# Patient Record
Sex: MALE | Race: WHITE | NOT HISPANIC OR LATINO | Employment: FULL TIME | URBAN - METROPOLITAN AREA
[De-identification: names, ages, dates, MRNs, and addresses within clinical notes are randomized per-mention and may not be internally consistent; named-entity substitution may affect disease eponyms.]

---

## 2018-07-20 ENCOUNTER — HISTORICAL (OUTPATIENT)
Dept: RADIOLOGY | Facility: HOSPITAL | Age: 48
End: 2018-07-20

## 2019-06-10 LAB
ABS NEUT (OLG): 3.8 X10(3)/MCL (ref 1.5–6.9)
ALBUMIN SERPL-MCNC: 4 GM/DL (ref 3.4–5)
ALBUMIN/GLOB SERPL: 1.1 RATIO
ALP SERPL-CCNC: 61 UNIT/L (ref 30–113)
ALT SERPL-CCNC: 39 UNIT/L (ref 10–45)
APTT PPP: 27.2 SECOND(S) (ref 25–35)
AST SERPL-CCNC: 22 UNIT/L (ref 15–37)
BILIRUB SERPL-MCNC: 0.5 MG/DL (ref 0.1–0.9)
BILIRUBIN DIRECT+TOT PNL SERPL-MCNC: 0.1 MG/DL (ref 0–0.3)
BILIRUBIN DIRECT+TOT PNL SERPL-MCNC: 0.4 MG/DL
BUN SERPL-MCNC: 15 MG/DL (ref 10–20)
CALCIUM SERPL-MCNC: 9.5 MG/DL (ref 8–10.5)
CHLORIDE SERPL-SCNC: 100 MMOL/L (ref 100–108)
CO2 SERPL-SCNC: 33 MMOL/L (ref 21–35)
CREAT SERPL-MCNC: 1.02 MG/DL (ref 0.7–1.3)
ERYTHROCYTE [DISTWIDTH] IN BLOOD BY AUTOMATED COUNT: 12.7 % (ref 11.5–17)
GLOBULIN SER-MCNC: 3.7 GM/DL
GLUCOSE SERPL-MCNC: 152 MG/DL (ref 75–116)
HCT VFR BLD AUTO: 46.3 % (ref 42–52)
HGB BLD-MCNC: 15.4 GM/DL (ref 14–18)
INR PPP: 1 (ref 0–1.2)
MCH RBC QN AUTO: 31 PG (ref 27–34)
MCHC RBC AUTO-ENTMCNC: 33 GM/DL (ref 31–36)
MCV RBC AUTO: 93 FL (ref 80–99)
PLATELET # BLD AUTO: 243 X10(3)/MCL (ref 140–400)
PMV BLD AUTO: 8.9 FL (ref 6.8–10)
POTASSIUM SERPL-SCNC: 4 MMOL/L (ref 3.6–5.2)
PROT SERPL-MCNC: 7.7 GM/DL (ref 6.4–8.2)
PROTHROMBIN TIME: 10.2 SECOND(S) (ref 9–12)
RBC # BLD AUTO: 4.98 X10(6)/MCL (ref 4.7–6.1)
SODIUM SERPL-SCNC: 138 MMOL/L (ref 135–145)
WBC # SPEC AUTO: 5.9 X10(3)/MCL (ref 4.5–11.5)

## 2019-06-13 ENCOUNTER — HISTORICAL (OUTPATIENT)
Dept: ANESTHESIOLOGY | Facility: HOSPITAL | Age: 49
End: 2019-06-13

## 2022-04-11 ENCOUNTER — HISTORICAL (OUTPATIENT)
Dept: ADMINISTRATIVE | Facility: HOSPITAL | Age: 52
End: 2022-04-11

## 2022-04-26 VITALS
SYSTOLIC BLOOD PRESSURE: 137 MMHG | OXYGEN SATURATION: 97 % | HEIGHT: 67 IN | BODY MASS INDEX: 26.84 KG/M2 | DIASTOLIC BLOOD PRESSURE: 84 MMHG | WEIGHT: 171 LBS

## 2022-04-30 NOTE — OP NOTE
PROCEDURE:  Colonoscopy.    PREOPERATIVE DIAGNOSIS:  Screening colonoscopy.    POSTOPERATIVE DIAGNOSIS:  Normal colonoscopy.    HISTORY:  A 49-year-old white male without any alarm symptoms, here for screening colonoscopy.     He was consented for the procedure prior to at my office.  Risks and benefits of procedure were explained to him in detail.  He is willing to undergo those risks.    PROCEDURE IN DETAIL:  He has brought down to the endoscopy suite, laid in the left lateral decubitus position.  Rectal exam was performed and it was good.  No internal and no external abnormalities.  Good rectal tone.  Prostate was normal.  Trilobar without any enlargement or nodularity.     The Olympus colonoscope was then lubricated and advanced with some moderate difficulty at the hepatic flexure due to some colonic laxity, but was able to easily get to cecum after some positional changes.       I was able to intubate the terminal ileum up to about 10 cm.  There were no masses, polyps, or mucosal changes to the cecum.     Three-hundred-sixty-degree circumferential views were then taken of the ascending colon and the entirety of the colon.  There were no abnormalities noted to the ascending colon and hepatic flexure.  There was an area initially thought to be a polyp in the transverse colon, but I took about 4 to 5 longitudinal looks back and forth and this initial mucosal abnormality that was thought to be a polyp essentially I believe was a fold and just disappeared into the mucosa with insufflation.  No polyp was harvested.  The rest of the splenic flexure, descending colon and sigmoid were all within normal limits.  He had no diverticular disease that was discernible.  The rectum on retroflexion showed no internal abnormalities.  The scope was withdrawn.  Patient tolerated the procedure well.  No complications.     In summary, a 49-year-old white male with a normal screening colonoscopy.  Recommend repeat  colonoscopy in 10 years unless clinically indicated sooner.     Thank you Dr. Raine Bearden for you allowing me to participate in the care of her patients.        SOPHIA/LISS   DD: 06/13/2019 0737   DT: 06/13/2019 0753  Job # [id number]    cc: Raine Bearden M.D.

## 2022-05-25 ENCOUNTER — HOSPITAL ENCOUNTER (OUTPATIENT)
Dept: RADIOLOGY | Facility: CLINIC | Age: 52
Discharge: HOME | End: 2022-05-25
Attending: ORTHOPAEDIC SURGERY
Payer: COMMERCIAL

## 2022-05-25 ENCOUNTER — HOSPITAL ENCOUNTER (OUTPATIENT)
Dept: RADIOLOGY | Facility: CLINIC | Age: 52
Discharge: HOME | End: 2022-05-25
Attending: NURSE PRACTITIONER

## 2022-05-25 ENCOUNTER — OFFICE VISIT (OUTPATIENT)
Dept: ORTHOPEDICS | Facility: CLINIC | Age: 52
End: 2022-05-25
Payer: COMMERCIAL

## 2022-05-25 DIAGNOSIS — M54.12 CERVICAL RADICULOPATHY: ICD-10-CM

## 2022-05-25 DIAGNOSIS — G89.29 CHRONIC RIGHT SHOULDER PAIN: ICD-10-CM

## 2022-05-25 DIAGNOSIS — S46.011D TRAUMATIC TEAR OF RIGHT ROTATOR CUFF, UNSPECIFIED TEAR EXTENT, SUBSEQUENT ENCOUNTER: ICD-10-CM

## 2022-05-25 DIAGNOSIS — M25.511 ACUTE PAIN OF RIGHT SHOULDER: ICD-10-CM

## 2022-05-25 DIAGNOSIS — M25.511 CHRONIC RIGHT SHOULDER PAIN: ICD-10-CM

## 2022-05-25 DIAGNOSIS — M54.12 CERVICAL RADICULOPATHY: Primary | ICD-10-CM

## 2022-05-25 PROCEDURE — 72050 XR CERVICAL SPINE COMPLETE 5 VIEW: ICD-10-PCS | Mod: ,,, | Performed by: NURSE PRACTITIONER

## 2022-05-25 PROCEDURE — 1159F MED LIST DOCD IN RCRD: CPT | Mod: CPTII,,, | Performed by: NURSE PRACTITIONER

## 2022-05-25 PROCEDURE — 73030 X-RAY EXAM OF SHOULDER: CPT | Mod: RT,,, | Performed by: NURSE PRACTITIONER

## 2022-05-25 PROCEDURE — 72050 X-RAY EXAM NECK SPINE 4/5VWS: CPT | Mod: ,,, | Performed by: NURSE PRACTITIONER

## 2022-05-25 PROCEDURE — 72040: ICD-10-PCS | Mod: ,,, | Performed by: NURSE PRACTITIONER

## 2022-05-25 PROCEDURE — 99203 PR OFFICE/OUTPT VISIT, NEW, LEVL III, 30-44 MIN: ICD-10-PCS | Mod: ,,, | Performed by: NURSE PRACTITIONER

## 2022-05-25 PROCEDURE — 1159F PR MEDICATION LIST DOCUMENTED IN MEDICAL RECORD: ICD-10-PCS | Mod: CPTII,,, | Performed by: NURSE PRACTITIONER

## 2022-05-25 PROCEDURE — 72040 X-RAY EXAM NECK SPINE 2-3 VW: CPT | Mod: ,,, | Performed by: NURSE PRACTITIONER

## 2022-05-25 PROCEDURE — 73030 PR  X-RAY SHOULDER 2+ VW: ICD-10-PCS | Mod: RT,,, | Performed by: NURSE PRACTITIONER

## 2022-05-25 PROCEDURE — 4010F ACE/ARB THERAPY RXD/TAKEN: CPT | Mod: CPTII,,, | Performed by: NURSE PRACTITIONER

## 2022-05-25 PROCEDURE — 99203 OFFICE O/P NEW LOW 30 MIN: CPT | Mod: ,,, | Performed by: NURSE PRACTITIONER

## 2022-05-25 PROCEDURE — 4010F PR ACE/ARB THEARPY RXD/TAKEN: ICD-10-PCS | Mod: CPTII,,, | Performed by: NURSE PRACTITIONER

## 2022-05-25 RX ORDER — VERAPAMIL HYDROCHLORIDE 240 MG/1
240 CAPSULE, EXTENDED RELEASE ORAL DAILY
COMMUNITY
Start: 2022-04-25

## 2022-05-25 RX ORDER — TESTOSTERONE CYPIONATE 200 MG/ML
INJECTION, SOLUTION INTRAMUSCULAR
COMMUNITY
Start: 2022-03-09

## 2022-05-25 RX ORDER — TRANDOLAPRIL 4 MG/1
4 TABLET ORAL DAILY
COMMUNITY
Start: 2022-04-25

## 2022-05-25 RX ORDER — FLUTICASONE PROPIONATE 50 MCG
SPRAY, SUSPENSION (ML) NASAL
COMMUNITY
Start: 2022-05-05

## 2022-05-25 RX ORDER — CELECOXIB 200 MG/1
200 CAPSULE ORAL DAILY
COMMUNITY
Start: 2022-05-05

## 2022-05-25 NOTE — PROGRESS NOTES
Chief Complaint:   Chief Complaint   Patient presents with    Right Shoulder - Pain    Pain     Right shoulder pain, patient had sx on this shoulder 2x before and the pain is back       History of present illness:  He is a pleasant 52-year-old who presents with right shoulder pain.  He has a history of rotator cuff repairs around 2005 and again in 2019. He reports an incident where he fell in the shower in recheck for his right rotator cuff.  He tried formal therapy which did give him some relief for a while.  He also notes numbness and tingling posteriorly in his shoulder that runs down his arm.  He denies previous cervical spine issues.  Of note he is also status post left sided rotator cuff repair. Here for second opinion      Past Medical History:   Diagnosis Date    Carpal tunnel syndrome     Hypertension        Past Surgical History:   Procedure Laterality Date    CARPAL TUNNEL RELEASE      HERNIA REPAIR      KNEE ARTHROSCOPY      KNEE SURGERY      SHOULDER ARTHROSCOPY      SHOULDER SURGERY         Current Outpatient Medications   Medication Sig    celecoxib (CELEBREX) 200 MG capsule Take 200 mg by mouth once daily.    fluticasone propionate (FLONASE) 50 mcg/actuation nasal spray SMARTSI-2 Spray(s) Both Nares Daily PRN    testosterone cypionate (DEPOTESTOTERONE CYPIONATE) 200 mg/mL injection SMARTSI.5 Milliliter(s) IM Once a Week    trandolapriL (MAVIK) 4 MG Tab Take 4 mg by mouth once daily.    verapamiL (VERELAN) 240 MG C24P Take 240 mg by mouth once daily.     No current facility-administered medications for this visit.       Review of patient's allergies indicates:  No Known Allergies    History reviewed. No pertinent family history.    Social History     Socioeconomic History    Marital status: Unknown   Tobacco Use    Smoking status: Never Smoker    Smokeless tobacco: Never Used       Review of Systems:    Constitution:   Denies chills, fever, and sweats.  HENT:   Denies headaches  or blurry vision.  Cardiovascular:  Denies chest pain or irregular heart beat.  Respiratory:   Denies cough or shortness of breath.  Gastrointestinal:  Denies abdominal pain, nausea, or vomiting.  Musculoskeletal:   Denies muscle cramps.  Neurological:   Denies dizziness or focal weakness.  Psychiatric/Behavior: Normal mental status.  Hematology/Lymph:  Denies bleeding problem or easy bruising/bleeding.  Skin:    Denies rash or suspicious lesions.    Examination:    Vital Signs:    Vitals:    05/25/22 1512   PainSc:   6       There is no height or weight on file to calculate BMI.    Constitution:   Well-developed, well nourished patient in no acute distress.  Neurological:   Alert and oriented x 3 and cooperative to examination.     Psychiatric/Behavior: Normal mental status.  Respiratory:   No shortness of breath.  Eyes:    Extraoccular muscles intact  Skin:    No scars, rash or suspicious lesions.    MSK:     Shoulder Exam:                   Right        Left  Skin:                                   Normal     Normal  AC joint tenderness:           None         None  Forward Flexion:                180            180  Abduction:                          180           180  External Rotation:               80              80  Internal Rotation:                80             80  Supraspinatus stress test: Neg           Neg  Hawkin's Impingement:     Neg           Neg  Neer Impingement:            Neg           Neg  Apprehension:                   Neg           Neg  [place]'s:                           Neg           Neg  Speed's test:                     Neg            Neg  Strength:  External Rotation:           5/5                5/5  Lift Off/belly press:          5/5                5/5    N-V status:                   Intact             Intact    C-spine: Normal ROM, pain with extension      Imaging: X-rays ordered and images interpreted today personally by me of his right shoulder show normal bony alignment.  Cervical spine xrays show degenerative changes at C5/6, C6/7.        Assessment: Cervical radiculopathy  -     X-ray Shoulder 2 or More Views Right; Future; Expected date: 05/25/2022  -     X-Ray Cervical Spine Complete 5 view; Future; Expected date: 05/25/2022    Traumatic tear of right rotator cuff, unspecified tear extent, subsequent encounter        Plan:  We will get an MRI of his right shoulder to evaluate his rotator cuff tear and see him back after results.

## 2022-06-06 ENCOUNTER — OFFICE VISIT (OUTPATIENT)
Dept: ORTHOPEDICS | Facility: CLINIC | Age: 52
End: 2022-06-06
Payer: COMMERCIAL

## 2022-06-06 VITALS
BODY MASS INDEX: 27.32 KG/M2 | SYSTOLIC BLOOD PRESSURE: 137 MMHG | HEIGHT: 66 IN | WEIGHT: 170 LBS | DIASTOLIC BLOOD PRESSURE: 84 MMHG

## 2022-06-06 DIAGNOSIS — M75.121 NONTRAUMATIC COMPLETE TEAR OF RIGHT ROTATOR CUFF: Primary | ICD-10-CM

## 2022-06-06 LAB
ANION GAP SERPL CALC-SCNC: 11 MEQ/L
BUN SERPL-MCNC: 18.8 MG/DL (ref 8.4–25.7)
CALCIUM SERPL-MCNC: 9.7 MG/DL (ref 8.4–10.2)
CHLORIDE SERPL-SCNC: 102 MMOL/L (ref 98–107)
CO2 SERPL-SCNC: 26 MMOL/L (ref 22–29)
CREAT SERPL-MCNC: 0.88 MG/DL (ref 0.73–1.18)
CREAT/UREA NIT SERPL: 21
GLUCOSE SERPL-MCNC: 81 MG/DL (ref 74–100)
POTASSIUM SERPL-SCNC: 4.1 MMOL/L (ref 3.5–5.1)
SODIUM SERPL-SCNC: 139 MMOL/L (ref 136–145)

## 2022-06-06 PROCEDURE — 1159F PR MEDICATION LIST DOCUMENTED IN MEDICAL RECORD: ICD-10-PCS | Mod: CPTII,,, | Performed by: ORTHOPAEDIC SURGERY

## 2022-06-06 PROCEDURE — 4010F ACE/ARB THERAPY RXD/TAKEN: CPT | Mod: CPTII,,, | Performed by: ORTHOPAEDIC SURGERY

## 2022-06-06 PROCEDURE — 99213 OFFICE O/P EST LOW 20 MIN: CPT | Mod: ,,, | Performed by: ORTHOPAEDIC SURGERY

## 2022-06-06 PROCEDURE — 99213 PR OFFICE/OUTPT VISIT, EST, LEVL III, 20-29 MIN: ICD-10-PCS | Mod: ,,, | Performed by: ORTHOPAEDIC SURGERY

## 2022-06-06 PROCEDURE — 36415 COLL VENOUS BLD VENIPUNCTURE: CPT | Performed by: ORTHOPAEDIC SURGERY

## 2022-06-06 PROCEDURE — 3079F PR MOST RECENT DIASTOLIC BLOOD PRESSURE 80-89 MM HG: ICD-10-PCS | Mod: CPTII,,, | Performed by: ORTHOPAEDIC SURGERY

## 2022-06-06 PROCEDURE — 93010 ELECTROCARDIOGRAM REPORT: CPT | Mod: ICN,,, | Performed by: INTERNAL MEDICINE

## 2022-06-06 PROCEDURE — 3075F PR MOST RECENT SYSTOLIC BLOOD PRESS GE 130-139MM HG: ICD-10-PCS | Mod: CPTII,,, | Performed by: ORTHOPAEDIC SURGERY

## 2022-06-06 PROCEDURE — 80048 BASIC METABOLIC PNL TOTAL CA: CPT | Performed by: ORTHOPAEDIC SURGERY

## 2022-06-06 PROCEDURE — 4010F PR ACE/ARB THEARPY RXD/TAKEN: ICD-10-PCS | Mod: CPTII,,, | Performed by: ORTHOPAEDIC SURGERY

## 2022-06-06 PROCEDURE — 3079F DIAST BP 80-89 MM HG: CPT | Mod: CPTII,,, | Performed by: ORTHOPAEDIC SURGERY

## 2022-06-06 PROCEDURE — 3008F BODY MASS INDEX DOCD: CPT | Mod: CPTII,,, | Performed by: ORTHOPAEDIC SURGERY

## 2022-06-06 PROCEDURE — 3008F PR BODY MASS INDEX (BMI) DOCUMENTED: ICD-10-PCS | Mod: CPTII,,, | Performed by: ORTHOPAEDIC SURGERY

## 2022-06-06 PROCEDURE — 3075F SYST BP GE 130 - 139MM HG: CPT | Mod: CPTII,,, | Performed by: ORTHOPAEDIC SURGERY

## 2022-06-06 PROCEDURE — 1159F MED LIST DOCD IN RCRD: CPT | Mod: CPTII,,, | Performed by: ORTHOPAEDIC SURGERY

## 2022-06-06 PROCEDURE — 93005 ELECTROCARDIOGRAM TRACING: CPT

## 2022-06-06 PROCEDURE — 93010 EKG 12-LEAD: ICD-10-PCS | Mod: ICN,,, | Performed by: INTERNAL MEDICINE

## 2022-06-06 RX ORDER — SODIUM CHLORIDE 9 MG/ML
INJECTION, SOLUTION INTRAVENOUS CONTINUOUS
Status: CANCELLED | OUTPATIENT
Start: 2022-06-23

## 2022-06-06 RX ORDER — CEFAZOLIN SODIUM 2 G/50ML
2 SOLUTION INTRAVENOUS
Status: CANCELLED | OUTPATIENT
Start: 2022-07-14

## 2022-06-06 RX ORDER — MUPIROCIN 20 MG/G
OINTMENT TOPICAL
Status: CANCELLED | OUTPATIENT
Start: 2022-07-14

## 2022-06-06 NOTE — PROGRESS NOTES
Chief Complaint:   Chief Complaint   Patient presents with    Right Shoulder - Pain    Results     MRI results Right shoulder       Consulting Physician: No ref. provider found    History of present illness:  He is a pleasant 52-year-old who presents with right shoulder pain.  He has a history of rotator cuff repairs around 2005 and again in 2019. He reports an incident where he fell in the shower in recheck for his right rotator cuff.  He tried formal therapy which did give him some relief for a while.  He also notes numbness and tingling posteriorly in his shoulder that runs down his arm.  He denies previous cervical spine issues.  Of note he is also status post left sided rotator cuff repair. Here for second opinion      He returns status post MRI.    Past Medical History:   Diagnosis Date    Carpal tunnel syndrome     Hypertension        Past Surgical History:   Procedure Laterality Date    CARPAL TUNNEL RELEASE      HERNIA REPAIR      KNEE ARTHROSCOPY      KNEE SURGERY      SHOULDER ARTHROSCOPY      SHOULDER SURGERY         Current Outpatient Medications   Medication Sig    celecoxib (CELEBREX) 200 MG capsule Take 200 mg by mouth once daily.    fluticasone propionate (FLONASE) 50 mcg/actuation nasal spray SMARTSI-2 Spray(s) Both Nares Daily PRN    testosterone cypionate (DEPOTESTOTERONE CYPIONATE) 200 mg/mL injection SMARTSI.5 Milliliter(s) IM Once a Week    trandolapriL (MAVIK) 4 MG Tab Take 4 mg by mouth once daily.    verapamiL (VERELAN) 240 MG C24P Take 240 mg by mouth once daily.     No current facility-administered medications for this visit.       Review of patient's allergies indicates:  No Known Allergies    History reviewed. No pertinent family history.    Social History     Socioeconomic History    Marital status: Unknown   Tobacco Use    Smoking status: Never Smoker    Smokeless tobacco: Never Used       Review of Systems:    Constitution:   Denies chills, fever, and  "sweats.  HENT:   Denies headaches or blurry vision.  Cardiovascular:  Denies chest pain or irregular heart beat.  Respiratory:   Denies cough or shortness of breath.  Gastrointestinal:  Denies abdominal pain, nausea, or vomiting.  Musculoskeletal:   Denies muscle cramps.  Neurological:   Denies dizziness or focal weakness.  Psychiatric/Behavior: Normal mental status.  Hematology/Lymph:  Denies bleeding problem or easy bruising/bleeding.  Skin:    Denies rash or suspicious lesions.    Examination:    Vital Signs:    Vitals:    06/06/22 1320   BP: 137/84   Weight: 77.1 kg (170 lb)   Height: 5' 6" (1.676 m)       Body mass index is 27.44 kg/m².    Constitution:   Well-developed, well nourished patient in no acute distress.  Neurological:   Alert and oriented x 3 and cooperative to examination.     Psychiatric/Behavior: Normal mental status.  Respiratory:   No shortness of breath.  Eyes:    Extraoccular muscles intact  Skin:    No scars, rash or suspicious lesions.    MSK:   Shoulder Exam:                   Right        Left  Skin:                                   Normal     Normal  AC joint tenderness:           None         None  Forward Flexion:                170            180  Abduction:                          100           180  External Rotation:               80              80  Internal Rotation:                80             80  Supraspinatus stress test: +           Neg  Hawkin's Impingement:     +           Neg  Neer Impingement:            +          Neg  Apprehension:                   Neg           Neg  [place]'s:                           Neg           Neg  Speed's test:                     Neg            Neg  Strength:  External Rotation:           5/5                5/5  Lift Off/belly press:          5/5                5/5    N-V status:                   Intact             Intact    C-spine: Normal ROM, pain with extension    Imaging:  MRI shoulder was reviewed which shows advanced tendinosis of " the supraspinatus with high-grade partial thickness tearing.       Assessment: Nontraumatic complete tear of right rotator cuff        Plan:  I recommended surgical intervention:  Right shoulder arthroscopic rotator cuff repair with allograft augmentation.  We discussed the details of the procedure and expected postoperative course.  We discussed the benefits of surgery which we did decrease his pain and increased function.  We discussed the risks of surgery which is small but could be significant is continued pain, stiffness, or infection.  After discussion about proceed.  Plans for surgery June 23

## 2022-07-11 NOTE — H&P
Chief Complaint:   MRI results Right shoulder    Consulting Physician: Self, Aaareferral    History of present illness:  He is a pleasant 52-year-old who presents with right shoulder pain.  He has a history of rotator cuff repairs around 2005 and again in 2019. He reports an incident where he fell in the shower in recheck for his right rotator cuff.  He tried formal therapy which did give him some relief for a while.  He also notes numbness and tingling posteriorly in his shoulder that runs down his arm.  He denies previous cervical spine issues.  Of note he is also status post left sided rotator cuff repair. Here for second opinion      He returns status post MRI.    Past Medical History:   Diagnosis Date    Carpal tunnel syndrome     Hypertension     Sleep apnea     cpap       Past Surgical History:   Procedure Laterality Date    CARPAL TUNNEL RELEASE      HERNIA REPAIR      KNEE ARTHROSCOPY      KNEE SURGERY      SHOULDER ARTHROSCOPY      SHOULDER SURGERY         No current facility-administered medications for this encounter.     Current Outpatient Medications   Medication Sig    celecoxib (CELEBREX) 200 MG capsule Take 200 mg by mouth once daily.    fluticasone propionate (FLONASE) 50 mcg/actuation nasal spray SMARTSI-2 Spray(s) Both Nares Daily PRN    testosterone cypionate (DEPOTESTOTERONE CYPIONATE) 200 mg/mL injection SMARTSI.5 Milliliter(s) IM Once a Week    trandolapriL (MAVIK) 4 MG Tab Take 4 mg by mouth once daily.    verapamiL (VERELAN) 240 MG C24P Take 240 mg by mouth once daily.       Review of patient's allergies indicates:  No Known Allergies    History reviewed. No pertinent family history.    Social History     Socioeconomic History    Marital status: Unknown   Tobacco Use    Smoking status: Never Smoker    Smokeless tobacco: Current User     Types: Snuff    Tobacco comment: 3 times daily   Substance and Sexual Activity    Alcohol use: Yes     Alcohol/week: 12.0 standard  "drinks     Types: 12 Cans of beer per week       Review of Systems:    Constitution:   Denies chills, fever, and sweats.  HENT:   Denies headaches or blurry vision.  Cardiovascular:  Denies chest pain or irregular heart beat.  Respiratory:   Denies cough or shortness of breath.  Gastrointestinal:  Denies abdominal pain, nausea, or vomiting.  Musculoskeletal:   Denies muscle cramps.  Neurological:   Denies dizziness or focal weakness.  Psychiatric/Behavior: Normal mental status.  Hematology/Lymph:  Denies bleeding problem or easy bruising/bleeding.  Skin:    Denies rash or suspicious lesions.    Examination:    Vital Signs:    Vitals:    06/16/22 1148   Weight: 72.6 kg (160 lb)   Height: 5' 7" (1.702 m)       Body mass index is 25.06 kg/m².    Constitution:   Well-developed, well nourished patient in no acute distress.  Neurological:   Alert and oriented x 3 and cooperative to examination.     Psychiatric/Behavior: Normal mental status.  Respiratory:   No shortness of breath.  Eyes:    Extraoccular muscles intact  Skin:    No scars, rash or suspicious lesions.    MSK:   Shoulder Exam:                   Right        Left  Skin:                                   Normal     Normal  AC joint tenderness:           None         None  Forward Flexion:                170            180  Abduction:                          100           180  External Rotation:               80              80  Internal Rotation:                80             80  Supraspinatus stress test: +           Neg  Hawkin's Impingement:     +           Neg  Neer Impingement:            +          Neg  Apprehension:                   Neg           Neg  [place]'s:                           Neg           Neg  Speed's test:                     Neg            Neg  Strength:  External Rotation:           5/5                5/5  Lift Off/belly press:          5/5                5/5    N-V status:                   Intact             Intact    C-spine: Normal " ROM, pain with extension    Imaging:  MRI shoulder was reviewed which shows advanced tendinosis of the supraspinatus with high-grade partial thickness tearing.       Assessment: Nontraumatic complete tear of right rotator cuff    Plan:  I recommended surgical intervention:  Right shoulder arthroscopic rotator cuff repair with allograft augmentation.  We discussed the details of the procedure and expected postoperative course.  We discussed the benefits of surgery which we did decrease his pain and increased function.  We discussed the risks of surgery which is small but could be significant is continued pain, stiffness, or infection.  After discussion about proceed.  Plans for surgery June 23

## 2022-07-13 ENCOUNTER — ANESTHESIA EVENT (OUTPATIENT)
Dept: SURGERY | Facility: HOSPITAL | Age: 52
End: 2022-07-13
Payer: COMMERCIAL

## 2022-07-14 ENCOUNTER — HOSPITAL ENCOUNTER (OUTPATIENT)
Facility: HOSPITAL | Age: 52
Discharge: HOME | End: 2022-07-14
Attending: ORTHOPAEDIC SURGERY | Admitting: ORTHOPAEDIC SURGERY
Payer: COMMERCIAL

## 2022-07-14 ENCOUNTER — ANESTHESIA (OUTPATIENT)
Dept: SURGERY | Facility: HOSPITAL | Age: 52
End: 2022-07-14
Payer: COMMERCIAL

## 2022-07-14 DIAGNOSIS — M75.121 NONTRAUMATIC COMPLETE TEAR OF RIGHT ROTATOR CUFF: Primary | ICD-10-CM

## 2022-07-14 LAB
ABS NEUT CALC (OHS): 1.84 X10(3)/MCL (ref 2.1–9.2)
EOSINOPHIL NFR BLD MANUAL: 0.41 X10(3)/MCL (ref 0–0.9)
EOSINOPHIL NFR BLD MANUAL: 8 % (ref 0–8)
ERYTHROCYTE [DISTWIDTH] IN BLOOD BY AUTOMATED COUNT: 13.5 % (ref 11.5–17)
HCT VFR BLD AUTO: 40.6 % (ref 42–52)
HGB BLD-MCNC: 13.8 GM/DL (ref 14–18)
IMM GRANULOCYTES # BLD AUTO: 0.02 X10(3)/MCL (ref 0–0.04)
IMM GRANULOCYTES NFR BLD AUTO: 0.4 %
LYMPH ABN # BLD MANUAL: 1 %
LYMPHOCYTES NFR BLD MANUAL: 1.84 X10(3)/MCL
LYMPHOCYTES NFR BLD MANUAL: 36 % (ref 13–40)
MCH RBC QN AUTO: 31.4 PG (ref 27–31)
MCHC RBC AUTO-ENTMCNC: 34 MG/DL (ref 33–36)
MCV RBC AUTO: 92.5 FL (ref 80–94)
MONOCYTES NFR BLD MANUAL: 0.97 X10(3)/MCL (ref 0.1–1.3)
MONOCYTES NFR BLD MANUAL: 19 % (ref 2–11)
NEUTROPHILS NFR BLD MANUAL: 36 % (ref 47–80)
NRBC BLD AUTO-RTO: 0 %
PLATELET # BLD AUTO: 216 X10(3)/MCL (ref 130–400)
PLATELET # BLD EST: ADEQUATE 10*3/UL
PMV BLD AUTO: 9.5 FL (ref 7.4–10.4)
RBC # BLD AUTO: 4.39 X10(6)/MCL (ref 4.7–6.1)
RBC MORPH BLD: NORMAL
WBC # SPEC AUTO: 5.1 X10(3)/MCL (ref 4.5–11.5)

## 2022-07-14 PROCEDURE — 85025 COMPLETE CBC W/AUTO DIFF WBC: CPT | Performed by: ANESTHESIOLOGY

## 2022-07-14 PROCEDURE — 29827 SHO ARTHRS SRG RT8TR CUF RPR: CPT | Mod: RT,,, | Performed by: ORTHOPAEDIC SURGERY

## 2022-07-14 PROCEDURE — 37000008 HC ANESTHESIA 1ST 15 MINUTES: Performed by: ORTHOPAEDIC SURGERY

## 2022-07-14 PROCEDURE — 27201423 OPTIME MED/SURG SUP & DEVICES STERILE SUPPLY: Performed by: ORTHOPAEDIC SURGERY

## 2022-07-14 PROCEDURE — 63600175 PHARM REV CODE 636 W HCPCS: Performed by: ANESTHESIOLOGY

## 2022-07-14 PROCEDURE — 27800903 OPTIME MED/SURG SUP & DEVICES OTHER IMPLANTS: Performed by: ORTHOPAEDIC SURGERY

## 2022-07-14 PROCEDURE — 29827 PR SHLDR ARTHROSCOP,SURG,W/ROTAT CUFF REPR: ICD-10-PCS | Mod: RT,,, | Performed by: ORTHOPAEDIC SURGERY

## 2022-07-14 PROCEDURE — 36000711: Performed by: ORTHOPAEDIC SURGERY

## 2022-07-14 PROCEDURE — C1713 ANCHOR/SCREW BN/BN,TIS/BN: HCPCS | Performed by: ORTHOPAEDIC SURGERY

## 2022-07-14 PROCEDURE — 63600175 PHARM REV CODE 636 W HCPCS: Performed by: NURSE ANESTHETIST, CERTIFIED REGISTERED

## 2022-07-14 PROCEDURE — 37000009 HC ANESTHESIA EA ADD 15 MINS: Performed by: ORTHOPAEDIC SURGERY

## 2022-07-14 PROCEDURE — 63600175 PHARM REV CODE 636 W HCPCS

## 2022-07-14 PROCEDURE — 36000710: Performed by: ORTHOPAEDIC SURGERY

## 2022-07-14 PROCEDURE — 63600175 PHARM REV CODE 636 W HCPCS: Performed by: ORTHOPAEDIC SURGERY

## 2022-07-14 PROCEDURE — 36415 COLL VENOUS BLD VENIPUNCTURE: CPT | Performed by: ANESTHESIOLOGY

## 2022-07-14 PROCEDURE — 71000033 HC RECOVERY, INTIAL HOUR: Performed by: ORTHOPAEDIC SURGERY

## 2022-07-14 PROCEDURE — 71000015 HC POSTOP RECOV 1ST HR: Performed by: ORTHOPAEDIC SURGERY

## 2022-07-14 PROCEDURE — 25000003 PHARM REV CODE 250

## 2022-07-14 PROCEDURE — 64415 NJX AA&/STRD BRCH PLXS IMG: CPT | Performed by: ANESTHESIOLOGY

## 2022-07-14 PROCEDURE — 25000003 PHARM REV CODE 250: Performed by: ORTHOPAEDIC SURGERY

## 2022-07-14 PROCEDURE — 25000003 PHARM REV CODE 250: Performed by: NURSE ANESTHETIST, CERTIFIED REGISTERED

## 2022-07-14 DEVICE — PATCH ARTHROFLEX 1.5X20X25MM: Type: IMPLANTABLE DEVICE | Site: SHOULDER | Status: FUNCTIONAL

## 2022-07-14 DEVICE — SUTURE ANCHOR, BIO- COMPOSITE PUSHLOCK
Type: IMPLANTABLE DEVICE | Site: SHOULDER | Status: FUNCTIONAL
Brand: ARTHREX®

## 2022-07-14 DEVICE — ANCHOR TISSUETAK TENDON: Type: IMPLANTABLE DEVICE | Site: SHOULDER | Status: FUNCTIONAL

## 2022-07-14 RX ORDER — EPINEPHRINE 1 MG/ML
INJECTION, SOLUTION INTRACARDIAC; INTRAMUSCULAR; INTRAVENOUS; SUBCUTANEOUS
Status: DISCONTINUED
Start: 2022-07-14 | End: 2022-07-14 | Stop reason: HOSPADM

## 2022-07-14 RX ORDER — DIPHENHYDRAMINE HYDROCHLORIDE 50 MG/ML
25 INJECTION INTRAMUSCULAR; INTRAVENOUS EVERY 6 HOURS PRN
Status: CANCELLED | OUTPATIENT
Start: 2022-07-14

## 2022-07-14 RX ORDER — GABAPENTIN 300 MG/1
300 CAPSULE ORAL
Status: CANCELLED | OUTPATIENT
Start: 2022-07-14 | End: 2022-07-14

## 2022-07-14 RX ORDER — ONDANSETRON 4 MG/1
4 TABLET, ORALLY DISINTEGRATING ORAL ONCE
Status: CANCELLED | OUTPATIENT
Start: 2022-07-14 | End: 2022-07-14

## 2022-07-14 RX ORDER — LIDOCAINE HYDROCHLORIDE 20 MG/ML
INJECTION, SOLUTION EPIDURAL; INFILTRATION; INTRACAUDAL; PERINEURAL
Status: DISCONTINUED | OUTPATIENT
Start: 2022-07-14 | End: 2022-07-14

## 2022-07-14 RX ORDER — ONDANSETRON 2 MG/ML
4 INJECTION INTRAMUSCULAR; INTRAVENOUS EVERY 12 HOURS PRN
Status: DISCONTINUED | OUTPATIENT
Start: 2022-07-14 | End: 2022-07-14 | Stop reason: HOSPADM

## 2022-07-14 RX ORDER — ONDANSETRON 2 MG/ML
4 INJECTION INTRAMUSCULAR; INTRAVENOUS DAILY PRN
Status: CANCELLED | OUTPATIENT
Start: 2022-07-14

## 2022-07-14 RX ORDER — FENTANYL CITRATE 50 UG/ML
INJECTION, SOLUTION INTRAMUSCULAR; INTRAVENOUS
Status: COMPLETED
Start: 2022-07-14 | End: 2022-07-14

## 2022-07-14 RX ORDER — SODIUM CHLORIDE, SODIUM GLUCONATE, SODIUM ACETATE, POTASSIUM CHLORIDE AND MAGNESIUM CHLORIDE 30; 37; 368; 526; 502 MG/100ML; MG/100ML; MG/100ML; MG/100ML; MG/100ML
1000 INJECTION, SOLUTION INTRAVENOUS CONTINUOUS
Status: CANCELLED | OUTPATIENT
Start: 2022-07-14 | End: 2022-08-13

## 2022-07-14 RX ORDER — EPHEDRINE SULFATE 50 MG/ML
INJECTION, SOLUTION INTRAVENOUS
Status: DISCONTINUED | OUTPATIENT
Start: 2022-07-14 | End: 2022-07-14

## 2022-07-14 RX ORDER — CEFAZOLIN SODIUM 2 G/50ML
2 SOLUTION INTRAVENOUS
Status: COMPLETED | OUTPATIENT
Start: 2022-07-14 | End: 2022-07-14

## 2022-07-14 RX ORDER — MIDAZOLAM HYDROCHLORIDE 1 MG/ML
2 INJECTION INTRAMUSCULAR; INTRAVENOUS ONCE AS NEEDED
Status: CANCELLED | OUTPATIENT
Start: 2022-07-14 | End: 2033-12-10

## 2022-07-14 RX ORDER — SODIUM CHLORIDE 0.9 % (FLUSH) 0.9 %
3 SYRINGE (ML) INJECTION
Status: CANCELLED | OUTPATIENT
Start: 2022-07-14

## 2022-07-14 RX ORDER — FENTANYL CITRATE 50 UG/ML
INJECTION, SOLUTION INTRAMUSCULAR; INTRAVENOUS
Status: DISCONTINUED | OUTPATIENT
Start: 2022-07-14 | End: 2022-07-14

## 2022-07-14 RX ORDER — ROPIVACAINE HYDROCHLORIDE 5 MG/ML
INJECTION, SOLUTION EPIDURAL; INFILTRATION; PERINEURAL
Status: COMPLETED
Start: 2022-07-14 | End: 2022-07-14

## 2022-07-14 RX ORDER — HYDROMORPHONE HYDROCHLORIDE 2 MG/ML
0.2 INJECTION, SOLUTION INTRAMUSCULAR; INTRAVENOUS; SUBCUTANEOUS EVERY 5 MIN PRN
Status: CANCELLED | OUTPATIENT
Start: 2022-07-14

## 2022-07-14 RX ORDER — ACETAMINOPHEN 500 MG
1000 TABLET ORAL
Status: CANCELLED | OUTPATIENT
Start: 2022-07-14 | End: 2022-07-14

## 2022-07-14 RX ORDER — MIDAZOLAM HYDROCHLORIDE 1 MG/ML
INJECTION INTRAMUSCULAR; INTRAVENOUS
Status: COMPLETED
Start: 2022-07-14 | End: 2022-07-14

## 2022-07-14 RX ORDER — EPINEPHRINE 1 MG/ML
INJECTION, SOLUTION INTRACARDIAC; INTRAMUSCULAR; INTRAVENOUS; SUBCUTANEOUS
Status: DISCONTINUED | OUTPATIENT
Start: 2022-07-14 | End: 2022-07-14 | Stop reason: HOSPADM

## 2022-07-14 RX ORDER — MORPHINE SULFATE 4 MG/ML
3 INJECTION, SOLUTION INTRAMUSCULAR; INTRAVENOUS
Status: DISCONTINUED | OUTPATIENT
Start: 2022-07-14 | End: 2022-07-14 | Stop reason: HOSPADM

## 2022-07-14 RX ORDER — ROPIVACAINE HYDROCHLORIDE 5 MG/ML
INJECTION, SOLUTION EPIDURAL; INFILTRATION; PERINEURAL
Status: COMPLETED | OUTPATIENT
Start: 2022-07-14 | End: 2022-07-14

## 2022-07-14 RX ORDER — LIDOCAINE HYDROCHLORIDE 10 MG/ML
1 INJECTION, SOLUTION EPIDURAL; INFILTRATION; INTRACAUDAL; PERINEURAL ONCE
Status: CANCELLED | OUTPATIENT
Start: 2022-07-14 | End: 2022-07-14

## 2022-07-14 RX ORDER — HYDROCODONE BITARTRATE AND ACETAMINOPHEN 5; 325 MG/1; MG/1
1 TABLET ORAL EVERY 4 HOURS PRN
Status: DISCONTINUED | OUTPATIENT
Start: 2022-07-14 | End: 2022-07-14 | Stop reason: HOSPADM

## 2022-07-14 RX ORDER — SODIUM CHLORIDE 0.9 G/100ML
IRRIGANT IRRIGATION
Status: DISCONTINUED | OUTPATIENT
Start: 2022-07-14 | End: 2022-07-14 | Stop reason: HOSPADM

## 2022-07-14 RX ORDER — ONDANSETRON HYDROCHLORIDE 2 MG/ML
INJECTION, SOLUTION INTRAMUSCULAR; INTRAVENOUS
Status: DISCONTINUED | OUTPATIENT
Start: 2022-07-14 | End: 2022-07-14

## 2022-07-14 RX ORDER — PROPOFOL 10 MG/ML
VIAL (ML) INTRAVENOUS
Status: DISCONTINUED | OUTPATIENT
Start: 2022-07-14 | End: 2022-07-14

## 2022-07-14 RX ORDER — ROCURONIUM BROMIDE 10 MG/ML
INJECTION, SOLUTION INTRAVENOUS
Status: DISCONTINUED | OUTPATIENT
Start: 2022-07-14 | End: 2022-07-14

## 2022-07-14 RX ORDER — METHOCARBAMOL 750 MG/1
750 TABLET, FILM COATED ORAL 3 TIMES DAILY
Qty: 21 TABLET | Refills: 0 | Status: SHIPPED | OUTPATIENT
Start: 2022-07-14

## 2022-07-14 RX ORDER — METOCLOPRAMIDE HYDROCHLORIDE 5 MG/ML
10 INJECTION INTRAMUSCULAR; INTRAVENOUS EVERY 10 MIN PRN
Status: CANCELLED | OUTPATIENT
Start: 2022-07-14

## 2022-07-14 RX ORDER — TRAMADOL HYDROCHLORIDE 50 MG/1
50 TABLET ORAL EVERY 4 HOURS PRN
Status: DISCONTINUED | OUTPATIENT
Start: 2022-07-14 | End: 2022-07-14 | Stop reason: HOSPADM

## 2022-07-14 RX ORDER — KETOROLAC TROMETHAMINE 10 MG/1
10 TABLET, FILM COATED ORAL 3 TIMES DAILY
Qty: 15 TABLET | Refills: 0 | Status: SHIPPED | OUTPATIENT
Start: 2022-07-14

## 2022-07-14 RX ORDER — SODIUM CHLORIDE 0.9 % (FLUSH) 0.9 %
3 SYRINGE (ML) INJECTION EVERY 6 HOURS PRN
Status: DISCONTINUED | OUTPATIENT
Start: 2022-07-14 | End: 2022-07-14 | Stop reason: HOSPADM

## 2022-07-14 RX ORDER — DEXAMETHASONE SODIUM PHOSPHATE 4 MG/ML
INJECTION, SOLUTION INTRA-ARTICULAR; INTRALESIONAL; INTRAMUSCULAR; INTRAVENOUS; SOFT TISSUE
Status: DISCONTINUED | OUTPATIENT
Start: 2022-07-14 | End: 2022-07-14

## 2022-07-14 RX ORDER — PHENYLEPHRINE HCL IN 0.9% NACL 1 MG/10 ML
SYRINGE (ML) INTRAVENOUS
Status: DISCONTINUED | OUTPATIENT
Start: 2022-07-14 | End: 2022-07-14

## 2022-07-14 RX ORDER — PROMETHAZINE HYDROCHLORIDE 25 MG/1
25 TABLET ORAL EVERY 6 HOURS PRN
Status: DISCONTINUED | OUTPATIENT
Start: 2022-07-14 | End: 2022-07-14 | Stop reason: HOSPADM

## 2022-07-14 RX ORDER — OXYCODONE AND ACETAMINOPHEN 5; 325 MG/1; MG/1
1 TABLET ORAL EVERY 6 HOURS PRN
Qty: 20 TABLET | Refills: 0 | Status: SHIPPED | OUTPATIENT
Start: 2022-07-14

## 2022-07-14 RX ADMIN — CEFAZOLIN SODIUM 2 G: 2 SOLUTION INTRAVENOUS at 01:07

## 2022-07-14 RX ADMIN — ROPIVACAINE HYDROCHLORIDE 30 ML: 5 INJECTION, SOLUTION EPIDURAL; INFILTRATION; PERINEURAL at 01:07

## 2022-07-14 RX ADMIN — ROCURONIUM BROMIDE 50 MG: 10 SOLUTION INTRAVENOUS at 01:07

## 2022-07-14 RX ADMIN — DEXAMETHASONE SODIUM PHOSPHATE 4 MG: 4 INJECTION, SOLUTION INTRA-ARTICULAR; INTRALESIONAL; INTRAMUSCULAR; INTRAVENOUS; SOFT TISSUE at 01:07

## 2022-07-14 RX ADMIN — FENTANYL CITRATE 100 MCG: 50 INJECTION, SOLUTION INTRAMUSCULAR; INTRAVENOUS at 01:07

## 2022-07-14 RX ADMIN — SODIUM CHLORIDE, SODIUM GLUCONATE, SODIUM ACETATE, POTASSIUM CHLORIDE AND MAGNESIUM CHLORIDE: 526; 502; 368; 37; 30 INJECTION, SOLUTION INTRAVENOUS at 01:07

## 2022-07-14 RX ADMIN — NORETHINDRONE AND ETHINYL ESTRADIOL 25 MG: KIT ORAL at 02:07

## 2022-07-14 RX ADMIN — ONDANSETRON 4 MG: 2 INJECTION INTRAMUSCULAR; INTRAVENOUS at 01:07

## 2022-07-14 RX ADMIN — SUGAMMADEX 200 MG: 100 INJECTION, SOLUTION INTRAVENOUS at 03:07

## 2022-07-14 RX ADMIN — MIDAZOLAM 2 MG: 1 INJECTION INTRAMUSCULAR; INTRAVENOUS at 01:07

## 2022-07-14 RX ADMIN — Medication 100 MCG: at 01:07

## 2022-07-14 RX ADMIN — PROPOFOL 150 MG: 10 INJECTION, EMULSION INTRAVENOUS at 01:07

## 2022-07-14 RX ADMIN — LIDOCAINE HYDROCHLORIDE 50 MG: 20 INJECTION, SOLUTION EPIDURAL; INFILTRATION; INTRACAUDAL; PERINEURAL at 01:07

## 2022-07-14 RX ADMIN — NORETHINDRONE AND ETHINYL ESTRADIOL 25 MG: KIT ORAL at 01:07

## 2022-07-14 NOTE — ANESTHESIA PROCEDURE NOTES
Peripheral Block    Patient location during procedure: holding area   Block not for primary anesthetic.  Reason for block: at surgeon's request and post-op pain management   Post-op Pain Location: Right Shoulder and arm   Start time: 7/14/2022 1:29 PM  Timeout: 7/14/2022 1:25 PM   End time: 7/14/2022 1:33 PM    Staffing  Authorizing Provider: Cesario Richards MD  Performing Provider: Cesario Richadrs MD    Preanesthetic Checklist  Completed: patient identified, IV checked, site marked, risks and benefits discussed, surgical consent, monitors and equipment checked, pre-op evaluation and timeout performed  Peripheral Block  Patient position: supine  Prep: ChloraPrep  Patient monitoring: heart rate, cardiac monitor, continuous pulse ox, continuous capnometry and frequent blood pressure checks  Block type: supraclavicular  Laterality: right  Injection technique: single shot  Needle  Needle type: Stimuplex   Needle gauge: 22 G  Needle length: 2 in  Needle localization: anatomical landmarks and ultrasound guidance   -ultrasound image captured on disc.  Assessment  Injection assessment: negative aspiration, negative parasthesia and local visualized surrounding nerve  Paresthesia pain: none  Heart rate change: no  Slow fractionated injection: yes  Pain Tolerance: comfortable throughout block and no complaints  Medications:    Medications: ropivacaine (NAROPIN) injection 0.5% - Perineural, Right Supraclavicular   30 mL - 7/14/2022 1:29:00 PM    Additional Notes  Consent obtained. Questions entertained and answered.  Sedation titrated per RN. See flowsheet/notes.    VSS.  [facility] RN monitoring vitals throughout procedure.  Patient tolerated procedure well.   Appears to have adequate block .

## 2022-07-14 NOTE — ANESTHESIA PREPROCEDURE EVALUATION
"                                                                                                             07/14/2022  Erik Vazquez is a 52 y.o., male.   right shoulder pain.  He has a history of rotator cuff repairs around 2005 and again in 2019. He reports an incident where he fell in the shower in recheck for his right rotator cuff.  He tried formal therapy which did give him some relief for a while.  He also notes numbness and tingling posteriorly in his shoulder that runs down his arm.  He denies previous cervical spine issues.  Of note he is also status post left sided rotator cuff repair  Imaging:  MRI shoulder was reviewed which shows advanced tendinosis of the supraspinatus with high-grade partial thickness tearing.        Assessment: Nontraumatic complete tear of right rotator cuff  Diagnosis:   Nontraumatic complete tear of right rotator cuff    He comes [facility] for the noted procedure under GETA w/ Supraclavicular block for postOp pain.  Procedure:   REPAIR, ROTATOR CUFF, ARTHROSCOPIC (Right Shoulder)    PMHx:  Carpal tunnel syndrome Hypertension   Sleep apnea      PSHx:  Surgical History  KNEE SURGERY SHOULDER ARTHROSCOPY   SHOULDER SURGERY KNEE ARTHROSCOPY   CARPAL TUNNEL RELEASE HERNIA REPAIR   COLONOSCOPY      Lab Data:      EKG:          Vital signs:  Pre Vitals  Current as of 07/14/22 1316  BP: 123/83 Pulse: 42   Resp: 20 SpO2: 97   Temp:    Height: 5' 7" (1.702 m) (07/14/22) Weight: 73.3 kg (161 lb 9.6 oz) (07/14/22)   BMI: 25.3 IBW: 66.1 kg (145 lb 12.2 oz)   Last edited 07/14/22 1310 by KELLY        Pre-op Assessment    I have reviewed the Patient Summary Reports.     I have reviewed the Nursing Notes. I have reviewed the NPO Status.   I have reviewed the Medications.     Review of Systems  Anesthesia Hx:  No problems with previous Anesthesia    Social:  Non-Smoker    Hematology/Oncology:  Hematology Normal   Oncology Normal     EENT/Dental:EENT/Dental Normal   Cardiovascular:   Exercise " tolerance: good Hypertension  Functional Capacity good / => 4 METS    Pulmonary:   Sleep Apnea    Renal/:  Renal/ Normal     Hepatic/GI:  Hepatic/GI Normal    Musculoskeletal:  Musculoskeletal Normal    Neurological:   Neuromuscular Disease,    Endocrine:  Endocrine Normal    Dermatological:  Skin Normal    Psych:  Psychiatric Normal           Physical Exam  General: Alert, Oriented, Well nourished and Cooperative    Airway:  Mallampati: II   Mouth Opening: Normal  TM Distance: Normal  Tongue: Normal  Neck ROM: Normal ROM    Dental:  Intact    Chest/Lungs:  Clear to auscultation, Normal Respiratory Rate    Heart:  Rate: Normal  Rhythm: Regular Rhythm        Anesthesia Plan  Type of Anesthesia, risks & benefits discussed:    Anesthesia Type: Gen ETT  Intra-op Monitoring Plan: Standard ASA Monitors  Post Op Pain Control Plan: multimodal analgesia, IV/PO Opioids PRN and peripheral nerve block  Induction:  IV and Inhalation  Airway Plan: Direct  Informed Consent: Informed consent signed with the Patient and all parties understand the risks and agree with anesthesia plan.  All questions answered. Patient consented to blood products? Yes  ASA Score: 2  Day of Surgery Review of History & Physical: H&P Update referred to the surgeon/provider.  Anesthesia Plan Notes: Block to be performed in preOp holding. See procedure note for Block in preOp holding.  IV induction for GETA.    Ready For Surgery From Anesthesia Perspective.     .

## 2022-07-14 NOTE — ANESTHESIA PROCEDURE NOTES
Intubation    Date/Time: 7/14/2022 1:42 PM  Performed by: Chester Alvarez CRNA  Authorized by: Cesario Richards MD     Intubation:     Induction:  Intravenous    Intubated:  Postinduction    Mask Ventilation:  Easy with oral airway    Attempts:  1    Attempted By:  CRNA    Method of Intubation:  Direct    Blade:  Rojas 2    Laryngeal View Grade: Grade IIA - cords partially seen      Difficult Airway Encountered?: No      Complications:  None    Airway Device:  Oral endotracheal tube    Airway Device Size:  7.5    Style/Cuff Inflation:  Cuffed (inflated to minimal occlusive pressure)    Inflation Amount (mL):  6    Tube secured:  21    Secured at:  The lips    Placement Verified By:  Capnometry    Complicating Factors:  None    Findings Post-Intubation:  BS equal bilateral

## 2022-07-14 NOTE — OR NURSING
1321  TIMEOUT DONE    13:29  DR. DE LUNA WILL ATTEMPT TO DO A RIGHT SUPRACLAVICULAR NERVE BLOCK.    13:33  BLOCK COMPLETED AND TOLERATED WELL.

## 2022-07-14 NOTE — PATIENT INSTRUCTIONS
.[facility]  Joey Proctor Jr., MD  [address], [address]   [place], [place] [id number] [phone], fax [phone]       SHOULDER ARTHROSCOPY    DIET:  Following general anesthesia, start with clear liquids to decrease chances of nausea.  Begin with water, coffee, tea, ginger ale, sprite, or apple juice.  If tolerated, advance to Jell-o, soup, crackers, or toast.  Once these are tolerated, advance to a regular diet.    DRESSING:  Keep the dressing clean and dry.  Remove the dressing on the 2nd day after surgery and replace the gauze with bandaids.  If you have steri-strips or band-aids in place of stitches, allow them to stay in place as long as possible.  They usually fall off on their own within 7-10 days.  You may trim the edges as the steri-strips begin to curl.  Steri-strips can get wet in the shower-pat dry with a towel after showers.    SHOWERING:  You may shower 5 days after surgery.  Remove the dressing or band-aids before showering.  Leave the incisions open to air after showering.  You can cover the sutures with band-aids if clothing irritates the stitches.  You do not need to reapply a dressing, but you may do so if you continue to have drainage.  It is not uncommon to have drainage a few days after surgery.      ACTIVITY:       -  Sleep as upright as possible using extra pillows as needed.  A recliner may also be helpful to sleep upright.  Do this for the first few days after surgery to help decrease pain and swelling.       -  Ice should be applied to the shoulder for 30 minutes, 5-6 times per day, for the 1st week to help decrease pain and swelling.  After the first week, apply as needed, especially after exercises and physical therapy.       - Wear the sling at all times including while sleeping.  The only time you may remove the sling is for showers and to perform the following exercises.    EXERCISES:  Perform the following exercises 3 times per day:       1.   Fully bend and straighten your fingers, wrist and elbow 10 times.       2.  Lean forward, bracing yourself on a table/chair with normal arm.  Let your surgery arm swing down in from and to the side of you in a gentle circular motion.  Use your upper body to generate the movement for this, NOT the surgery arm.  Your arm should move in gentle circles like a pendulum or elephant trunk (picture below)                        PAIN MEDICATION:       -  Use the Percocet as prescribed for pain after surgery.  Pain medicine can cause nausea and vomiting, especially on an empty stomach.       -  In addition, you can take Ketorolac as prescribed or Iburpofen 200 mg, 4 pills every 8 hours.  Ketorolac or Iburpofen medicine can irritate your stomach or cause heartburn.  If this happens to you, stop taking the medicine.       -  Ice and elevation are more useful than pain medicine for surgical pain.  If you are having too much pain or discomfort, try more ice and higher elevation.       -  Do NOT drive a vehicle or use heavy machinery while taking pain medication       -  Do NOT drink alcohol while taking pain medication.    BLOOD CLOT PREVENTION:  If you are aware that you are at high risk for blood blots, notify your physician.  In general, you should walk s much as possible after surgery to increase blood circulation throughout your body. Most patients will take Aspirin 81 mg, 1 pill twice a day for 2 weeks to help further prevent blood clots.    DRIVING OR FLYING:  In general, you should NOT drive while wearing a sling  You must NEVER drive while taking narcotic pain medication  You may ride in a car, take a train, or fly once you feel comfortable    PHYSICAL THERAPY:  Take your physical therapy prescription to the physical therapy clinic of your choice.  Make an appointment 1-2 days after surgery.  All exercises and activities must be within the protocol until rehab is complete.  Some patients will not start physical therapy  until after their first follow up appointment.    WORK OR SCHOOL:  You may return to an office job or school whenver comfortable.  Most patients return ~ 1 week after surgery.  For more active jobs that require extended walking, squatting, or lifting, you can wait until after your follow up appointment.  Any other types of jobs should be discussed with Dr. Proctor to determine a date for return to work.    PROBLEMS TO REPORT:       1.  Fever greater than 102 F       2.  Incision that is very red and/or draining pus       3.  Unable to urinate within 8 hours of surgery (a rare effect of being put to sleep for surgery)  Calls should be directed to the clinic:  [phone]    RETURN APPOINTMENT:  To confirm or reschedule your appointment, call [phone]

## 2022-07-14 NOTE — TRANSFER OF CARE
"Anesthesia Transfer of Care Note    Patient: Erik Vazquez    Procedure(s) Performed: Procedure(s) (LRB):  REPAIR, ROTATOR CUFF, ARTHROSCOPIC (Right)    Patient location: PACU    Anesthesia Type: general    Transport from OR: Transported from OR on room air with adequate spontaneous ventilation    Post pain: adequate analgesia    Post assessment: no apparent anesthetic complications    Post vital signs: stable    Level of consciousness: awake    Nausea/Vomiting: no nausea/vomiting    Complications: none    Transfer of care protocol was followed      Last vitals:   Visit Vitals  /83   Pulse (!) 42   Temp 36.1 °C (97 °F) (Tympanic)   Resp 18   Ht 5' 7" (1.702 m)   Wt 73.3 kg (161 lb 9.6 oz)   SpO2 97%   BMI 25.31 kg/m²     "

## 2022-07-14 NOTE — OP NOTE
DATE OF SERVICE: 07/14/2022    SURGEON: Joey Proctor MD    PREOPERATIVE DIAGNOSIS: Right shoulder rotator cuff tear, recurrent  POSTOPERATIVE DIAGNOSIS: Right shoulder rotator cuff tear, recurrent    PROCEDU RE PERFORMED:   1. Right shoulder arthroscopic rotator cuff repair with allograft  2. Right shoulder arthroscopic foreign body removal    ASSISTANT: None    ANESTHESIA: General plus regional    ESTIMATED BLOOD LOSS: Minimal.    COMPLICATIONS: None.    IMPLANTS:    1. Medial Row: PDS Taks   2. Lateral Row: Arthrex Pushlock Anchors     INDICATIONS FOR PROCEDURE: Erik Vazquez is a 52 y.o. year old who has had ongoing right shoulder pain and weakness. The patient was seen in the clinic and preoperative imaging has revealed a torn rotator cuff. The patient has failed nonoperative treatment and has elected for operative intervention. Risks and benefits were thoroughly explained and consent was obtained prior to today's procedure.    DESCRIPTION OF PROCEDURE: The patient was seen in the preoperative holding area where the history and physical were reviewed without change. The operative shoulder was marked, consents were reviewed, and any questions were answered for the patient. A regional blockade of the shoulder was performed by the Anesthesia Service. The patient was induced under general anesthesia. Next the patient was placed upright into the beachchair position with care taken to ensure the cervical spine was well positioned and the face, eyes and all bony prominences well protected. Preoperative time-out led by the surgeon was performed verifying the patient, procedure, and preoperative antibiotics. The right upper extremity was then prepped and draped in the usual sterile fashion and secured to an arm fritz.    A standard posterior portal was established with a #11-blade.  The arthroscope was inserted into the glenohumeral joint atraumatically. The joint was insufflated with arthroscopic fluid. We then  made a standard anterior portal using an #18-gauge spinal needle for guidance. An arthroscopic probe was inserted through the anterior portal and diagnostic arthroscopy begun.    This revealed a previous biceps tenodesis.  Intact superior labrum. An intact anterior, inferior and posterior labrum. The articular cartilage of the humeral head was intact. The articular cartilage of the glenoid was intact. The subscapularis tendon was intact. The articular side of the supraspinatus tendon was partially torn and thinned.  The partially torn and portion was retracted back to the level of the glenoid.  This was not able to be approximated back to the tuberosity.  The remaining portion of the rotator cuff was intact and repair down.  There is a few the sutures that had come loose which I removed with a grasper and a biter.  The articular side of the infraspinatus tendon was intact.    I then moved to the subacromial space.  We identified the partial thickness rotator cuff tear which involved the supraspinatus tendons. The quality of the tissue was thinned. We identified the undersurface of the acromion. We used a VAPR to debride the undersurface of the acromion up to the anterior and lateral margins. We identified the CA ligament and reflected it off the acromion. We continued debridement of the bursal tissue, identified the rotator cuff tear, and worked to define the edges more clearly. We used the liberator VAPR to release adhesions both superiorly and inferiorly above and below the rotator cuff. After we had done this, it was able to mobilize the quite well. We began the repair once we had our tissue appropriately mobilized.  I placed the allograft through the posterior portal and used attacks in order to secure it to the remnant of the rotator cuff medially.  I then used sutures that I previously passed through the allograft and secured them to the lateral aspect of the humerus using a PushLock anchor.  One was placed  anteriorly and 1 was placed posteriorly.  A mattress suture was also incorporated from the graft.  I was able to probe the repair was happy with instability.    Once we completed this, we took the remaining final arthroscopic pictures. We then removed our instruments, closed the portals with #3-0 monocryl suture. Sterile dressings were applied. The patient was then placed in an abduction pillow and sling, awoken from general anesthetic, and taken to recovery room in a stable condition.

## 2022-07-15 VITALS
BODY MASS INDEX: 25.37 KG/M2 | RESPIRATION RATE: 20 BRPM | WEIGHT: 161.63 LBS | HEART RATE: 69 BPM | OXYGEN SATURATION: 94 % | HEIGHT: 67 IN | SYSTOLIC BLOOD PRESSURE: 122 MMHG | DIASTOLIC BLOOD PRESSURE: 77 MMHG | TEMPERATURE: 97 F

## 2022-07-18 NOTE — ANESTHESIA POSTPROCEDURE EVALUATION
Anesthesia Post Evaluation    Patient: Erik Vazquez    Procedure(s) Performed: Procedure(s) (LRB):  REPAIR, ROTATOR CUFF, ARTHROSCOPIC (Right)    Final Anesthesia Type: general      Patient location during evaluation: PACU  Patient participation: Yes- Able to Participate  Level of consciousness: awake and alert and oriented  Post-procedure vital signs: reviewed and stable  Pain management: adequate  Airway patency: patent  EWA mitigation strategies: Verification of full reversal of neuromuscular block  PONV status at discharge: No PONV  Anesthetic complications: no      Cardiovascular status: blood pressure returned to baseline and stable  Respiratory status: spontaneous ventilation and unassisted  Hydration status: euvolemic  Follow-up not needed.  Comments: [facility]          Vitals Value Taken Time   /77 07/14/22 1616   Temp 36.7 07/18/22 0730   Pulse 63 07/14/22 1622   Resp 20 07/14/22 1615   SpO2 95 % 07/14/22 1622   Vitals shown include unvalidated device data.      Event Time   Out of Recovery 16:00:00         Pain/Issac Score: No data recorded

## 2022-08-01 ENCOUNTER — OFFICE VISIT (OUTPATIENT)
Dept: ORTHOPEDICS | Facility: CLINIC | Age: 52
End: 2022-08-01
Payer: COMMERCIAL

## 2022-08-01 VITALS
HEIGHT: 67 IN | HEART RATE: 69 BPM | BODY MASS INDEX: 25.27 KG/M2 | SYSTOLIC BLOOD PRESSURE: 122 MMHG | DIASTOLIC BLOOD PRESSURE: 77 MMHG | WEIGHT: 161 LBS

## 2022-08-01 DIAGNOSIS — Z98.890 S/P RIGHT ROTATOR CUFF REPAIR: Primary | ICD-10-CM

## 2022-08-01 PROCEDURE — 3074F SYST BP LT 130 MM HG: CPT | Mod: CPTII,,, | Performed by: ORTHOPAEDIC SURGERY

## 2022-08-01 PROCEDURE — 4010F ACE/ARB THERAPY RXD/TAKEN: CPT | Mod: CPTII,,, | Performed by: ORTHOPAEDIC SURGERY

## 2022-08-01 PROCEDURE — 3008F BODY MASS INDEX DOCD: CPT | Mod: CPTII,,, | Performed by: ORTHOPAEDIC SURGERY

## 2022-08-01 PROCEDURE — 1159F MED LIST DOCD IN RCRD: CPT | Mod: CPTII,,, | Performed by: ORTHOPAEDIC SURGERY

## 2022-08-01 PROCEDURE — 3074F PR MOST RECENT SYSTOLIC BLOOD PRESSURE < 130 MM HG: ICD-10-PCS | Mod: CPTII,,, | Performed by: ORTHOPAEDIC SURGERY

## 2022-08-01 PROCEDURE — 99024 POSTOP FOLLOW-UP VISIT: CPT | Mod: ,,, | Performed by: ORTHOPAEDIC SURGERY

## 2022-08-01 PROCEDURE — 1159F PR MEDICATION LIST DOCUMENTED IN MEDICAL RECORD: ICD-10-PCS | Mod: CPTII,,, | Performed by: ORTHOPAEDIC SURGERY

## 2022-08-01 PROCEDURE — 3008F PR BODY MASS INDEX (BMI) DOCUMENTED: ICD-10-PCS | Mod: CPTII,,, | Performed by: ORTHOPAEDIC SURGERY

## 2022-08-01 PROCEDURE — 4010F PR ACE/ARB THEARPY RXD/TAKEN: ICD-10-PCS | Mod: CPTII,,, | Performed by: ORTHOPAEDIC SURGERY

## 2022-08-01 PROCEDURE — 3078F DIAST BP <80 MM HG: CPT | Mod: CPTII,,, | Performed by: ORTHOPAEDIC SURGERY

## 2022-08-01 PROCEDURE — 99024 PR POST-OP FOLLOW-UP VISIT: ICD-10-PCS | Mod: ,,, | Performed by: ORTHOPAEDIC SURGERY

## 2022-08-01 PROCEDURE — 3078F PR MOST RECENT DIASTOLIC BLOOD PRESSURE < 80 MM HG: ICD-10-PCS | Mod: CPTII,,, | Performed by: ORTHOPAEDIC SURGERY

## 2022-08-01 NOTE — PROGRESS NOTES
Chief Complaint:   Chief Complaint   Patient presents with    Right Shoulder - Post-op Evaluation    Post-op Evaluation     3wk S/P Right shoulder RTC rep sx 7/14/22 GL 10/12/22       History of present illness:    07/14/2022:  Right shoulder arthroscopic rotator cuff repair with allograft    He returns today.  He is not having much in the way of pain.  He is compliant in the sling.    Musculoskeletal:   Incisions healing.  Distally neurovascular intact         Assessment: S/P right rotator cuff repair        Plan:  Will start formal physical therapy.  I will see him back in 4 weeks for range-of-motion check.

## 2022-08-29 ENCOUNTER — OFFICE VISIT (OUTPATIENT)
Dept: ORTHOPEDICS | Facility: CLINIC | Age: 52
End: 2022-08-29
Payer: COMMERCIAL

## 2022-08-29 VITALS — WEIGHT: 160.94 LBS | BODY MASS INDEX: 25.26 KG/M2 | HEIGHT: 67 IN

## 2022-08-29 DIAGNOSIS — Z98.890 STATUS POST RIGHT ROTATOR CUFF REPAIR: Primary | ICD-10-CM

## 2022-08-29 PROCEDURE — 99024 PR POST-OP FOLLOW-UP VISIT: ICD-10-PCS | Mod: ,,, | Performed by: ORTHOPAEDIC SURGERY

## 2022-08-29 PROCEDURE — 99024 POSTOP FOLLOW-UP VISIT: CPT | Mod: ,,, | Performed by: ORTHOPAEDIC SURGERY

## 2022-08-29 PROCEDURE — 3008F BODY MASS INDEX DOCD: CPT | Mod: CPTII,,, | Performed by: ORTHOPAEDIC SURGERY

## 2022-08-29 PROCEDURE — 4010F PR ACE/ARB THEARPY RXD/TAKEN: ICD-10-PCS | Mod: CPTII,,, | Performed by: ORTHOPAEDIC SURGERY

## 2022-08-29 PROCEDURE — 3008F PR BODY MASS INDEX (BMI) DOCUMENTED: ICD-10-PCS | Mod: CPTII,,, | Performed by: ORTHOPAEDIC SURGERY

## 2022-08-29 PROCEDURE — 4010F ACE/ARB THERAPY RXD/TAKEN: CPT | Mod: CPTII,,, | Performed by: ORTHOPAEDIC SURGERY

## 2022-08-29 PROCEDURE — 1159F PR MEDICATION LIST DOCUMENTED IN MEDICAL RECORD: ICD-10-PCS | Mod: CPTII,,, | Performed by: ORTHOPAEDIC SURGERY

## 2022-08-29 PROCEDURE — 1159F MED LIST DOCD IN RCRD: CPT | Mod: CPTII,,, | Performed by: ORTHOPAEDIC SURGERY

## 2022-08-29 NOTE — PROGRESS NOTES
Chief Complaint:   Chief Complaint   Patient presents with    Right Shoulder - Post-op Evaluation    Post-op Evaluation     7 week s/p Right shoulder RTC rep here for eval at [facility]. Here for eval, doing good,.  sx 7/14/22 gl. 10/12/22   mn         History of present illness:    07/14/2022:  Right shoulder arthroscopic rotator cuff repair with allograft    He returns today.  He is working in PT.  He is compliant in the sling.    Musculoskeletal:   Incisions healing.  Distally neurovascular intact.  His range of motion actively assisted is full.         Assessment: Status post right rotator cuff repair      Plan:  Continue formal physical therapy.  I will see him back in 6 weeks for range-of-motion check.

## 2022-09-07 ENCOUNTER — HOSPITAL ENCOUNTER (OUTPATIENT)
Dept: RADIOLOGY | Facility: CLINIC | Age: 52
Discharge: HOME | End: 2022-09-07
Attending: ORTHOPAEDIC SURGERY
Payer: COMMERCIAL

## 2022-09-07 ENCOUNTER — OFFICE VISIT (OUTPATIENT)
Dept: ORTHOPEDICS | Facility: CLINIC | Age: 52
End: 2022-09-07
Payer: COMMERCIAL

## 2022-09-07 VITALS — HEIGHT: 67 IN | BODY MASS INDEX: 25.11 KG/M2 | WEIGHT: 160 LBS

## 2022-09-07 DIAGNOSIS — M25.512 ACUTE PAIN OF LEFT SHOULDER: ICD-10-CM

## 2022-09-07 DIAGNOSIS — M75.42 IMPINGEMENT SYNDROME OF LEFT SHOULDER: Primary | ICD-10-CM

## 2022-09-07 PROCEDURE — 99213 PR OFFICE/OUTPT VISIT, EST, LEVL III, 20-29 MIN: ICD-10-PCS | Mod: 25,24,, | Performed by: ORTHOPAEDIC SURGERY

## 2022-09-07 PROCEDURE — 20610 LARGE JOINT ASPIRATION/INJECTION: L SUBACROMIAL BURSA: ICD-10-PCS | Mod: 79,LT,, | Performed by: ORTHOPAEDIC SURGERY

## 2022-09-07 PROCEDURE — 3008F BODY MASS INDEX DOCD: CPT | Mod: CPTII,,, | Performed by: ORTHOPAEDIC SURGERY

## 2022-09-07 PROCEDURE — 4010F PR ACE/ARB THEARPY RXD/TAKEN: ICD-10-PCS | Mod: CPTII,,, | Performed by: ORTHOPAEDIC SURGERY

## 2022-09-07 PROCEDURE — 3008F PR BODY MASS INDEX (BMI) DOCUMENTED: ICD-10-PCS | Mod: CPTII,,, | Performed by: ORTHOPAEDIC SURGERY

## 2022-09-07 PROCEDURE — 1159F MED LIST DOCD IN RCRD: CPT | Mod: CPTII,,, | Performed by: ORTHOPAEDIC SURGERY

## 2022-09-07 PROCEDURE — 73030 XR SHOULDER COMPLETE 2 OR MORE VIEWS LEFT: ICD-10-PCS | Mod: LT,,, | Performed by: ORTHOPAEDIC SURGERY

## 2022-09-07 PROCEDURE — 20610 DRAIN/INJ JOINT/BURSA W/O US: CPT | Mod: 79,LT,, | Performed by: ORTHOPAEDIC SURGERY

## 2022-09-07 PROCEDURE — 4010F ACE/ARB THERAPY RXD/TAKEN: CPT | Mod: CPTII,,, | Performed by: ORTHOPAEDIC SURGERY

## 2022-09-07 PROCEDURE — 99213 OFFICE O/P EST LOW 20 MIN: CPT | Mod: 25,24,, | Performed by: ORTHOPAEDIC SURGERY

## 2022-09-07 PROCEDURE — 1159F PR MEDICATION LIST DOCUMENTED IN MEDICAL RECORD: ICD-10-PCS | Mod: CPTII,,, | Performed by: ORTHOPAEDIC SURGERY

## 2022-09-07 PROCEDURE — 73030 X-RAY EXAM OF SHOULDER: CPT | Mod: LT,,, | Performed by: ORTHOPAEDIC SURGERY

## 2022-09-07 RX ORDER — BETAMETHASONE SODIUM PHOSPHATE AND BETAMETHASONE ACETATE 3; 3 MG/ML; MG/ML
6 INJECTION, SUSPENSION INTRA-ARTICULAR; INTRALESIONAL; INTRAMUSCULAR; SOFT TISSUE
Status: DISCONTINUED | OUTPATIENT
Start: 2022-09-07 | End: 2022-09-07 | Stop reason: HOSPADM

## 2022-09-07 RX ORDER — LIDOCAINE HYDROCHLORIDE 20 MG/ML
5 INJECTION, SOLUTION EPIDURAL; INFILTRATION; INTRACAUDAL; PERINEURAL
Status: DISCONTINUED | OUTPATIENT
Start: 2022-09-07 | End: 2022-09-07 | Stop reason: HOSPADM

## 2022-09-07 RX ADMIN — BETAMETHASONE SODIUM PHOSPHATE AND BETAMETHASONE ACETATE 6 MG: 3; 3 INJECTION, SUSPENSION INTRA-ARTICULAR; INTRALESIONAL; INTRAMUSCULAR; SOFT TISSUE at 08:09

## 2022-09-07 RX ADMIN — LIDOCAINE HYDROCHLORIDE 5 ML: 20 INJECTION, SOLUTION EPIDURAL; INFILTRATION; INTRACAUDAL; PERINEURAL at 08:09

## 2022-09-07 NOTE — PROGRESS NOTES
Chief Complaint:   Chief Complaint   Patient presents with    Left Shoulder - Pain    Shoulder Pain     Left shoulder pain, no specific injury, getting XR done today       Consulting Physician: No ref. provider found    History of present illness:    he is a pleasant 52 y.o. year old male who has had continued left shoulder pain a since the summer of .  The pain is located globally around the shoulder.  He knows it worse with overhead motion.  It is somewhat better with rest.  He has tried anti-inflammatory medicines and formal physical therapy without relief.  He denies any new numbness or tingling.    Past Medical History:   Diagnosis Date    Carpal tunnel syndrome     Hypertension     Sleep apnea     cpap       Past Surgical History:   Procedure Laterality Date    ARTHROSCOPIC REPAIR OF ROTATOR CUFF OF SHOULDER Right 2022    Procedure: REPAIR, ROTATOR CUFF, ARTHROSCOPIC;  Surgeon: Joey Proctor Jr., MD;  Location: [facility] OR;  Service: Orthopedics;  Laterality: Right;  Cuff Mend    CARPAL TUNNEL RELEASE      COLONOSCOPY N/A     HERNIA REPAIR      KNEE ARTHROSCOPY      KNEE SURGERY      SHOULDER ARTHROSCOPY      SHOULDER SURGERY         Current Outpatient Medications   Medication Sig    celecoxib (CELEBREX) 200 MG capsule Take 200 mg by mouth once daily.    fluticasone propionate (FLONASE) 50 mcg/actuation nasal spray SMARTSI-2 Spray(s) Both Nares Daily PRN    ketorolac (TORADOL) 10 mg tablet Take 1 tablet (10 mg total) by mouth 3 (three) times daily.    methocarbamoL (ROBAXIN) 750 MG Tab Take 1 tablet (750 mg total) by mouth 3 (three) times daily.    oxyCODONE-acetaminophen (PERCOCET) 5-325 mg per tablet Take 1 tablet by mouth every 6 (six) hours as needed for Pain.    testosterone cypionate (DEPOTESTOTERONE CYPIONATE) 200 mg/mL injection SMARTSI.5 Milliliter(s) IM Once a Week    trandolapriL (MAVIK) 4 MG Tab Take 4 mg by mouth once daily.    verapamiL (VERELAN) 240 MG C24P Take 240 mg by mouth once  "daily.     No current facility-administered medications for this visit.       Review of patient's allergies indicates:  No Known Allergies    History reviewed. No pertinent family history.    Social History     Socioeconomic History    Marital status: Unknown   Tobacco Use    Smoking status: Never    Smokeless tobacco: Current     Types: Snuff    Tobacco comments:     3 times daily   Substance and Sexual Activity    Alcohol use: Yes     Alcohol/week: 12.0 standard drinks     Types: 12 Cans of beer per week    Drug use: Never       Review of Systems:    Constitution:   Denies chills, fever, and sweats.  HENT:   Denies headaches or blurry vision.  Cardiovascular:  Denies chest pain or irregular heart beat.  Respiratory:   Denies cough or shortness of breath.  Gastrointestinal:  Denies abdominal pain, nausea, or vomiting.  Musculoskeletal:   Denies muscle cramps.  Neurological:   Denies dizziness or focal weakness.  Psychiatric/Behavior: Normal mental status.  Hematology/Lymph:  Denies bleeding problem or easy bruising/bleeding.  Skin:    Denies rash or suspicious lesions.    Examination:    Vital Signs:    Vitals:    09/07/22 0804   Weight: 72.6 kg (160 lb)   Height: 5' 7" (1.702 m)       Body mass index is 25.06 kg/m².    Constitution:   Well-developed, well nourished patient in no acute distress.  Neurological:   Alert and oriented x 3 and cooperative to examination.     Psychiatric/Behavior: Normal mental status.  Respiratory:   No shortness of breath.  Eyes:    Extraoccular muscles intact  Skin:    No scars, rash or suspicious lesions.    MSK:   Shoulder Exam:                   Right        Left  Skin:                                   Normal     Normal  AC joint tenderness:           None         None  Forward Flexion:                180            170  Abduction:                          180           180  External Rotation:               80              80  Internal Rotation:                80             " 80  Supraspinatus stress test: Neg           +  Hawkin's Impingement:     Neg           +  Neer Impingement:            Neg           +  Apprehension:                   Neg           Neg  Rutherford's:                           Neg           Neg  Speed's test:                     Neg            Neg  Strength:  External Rotation:           5/5                5/5  Lift Off/belly press:          5/5                5/5    N-V status:                   Intact             Intact    C-spine: Normal ROM, NT      Imaging: X-rays ordered and images interpreted today personally by me of four views of the left shoulder show evidence of prior rotator cuff repair.  No arthritis        Assessment: Impingement syndrome of left shoulder  -     X-Ray Shoulder 2 or More Views Left; Future; Expected date: 09/07/2022        Plan:  Try an injection today.  If his pain persists will get MRI

## 2022-09-07 NOTE — PROCEDURES
Large Joint Aspiration/Injection: L subacromial bursa    Date/Time: 9/7/2022 8:00 AM  Performed by: Joey Proctor Jr., MD  Authorized by: Joey Proctor Jr., MD     Consent Done?:  Yes (Verbal)  Indications:  Pain  Site marked: the procedure site was marked    Timeout: prior to procedure the correct patient, procedure, and site was verified    Prep: patient was prepped and draped in usual sterile fashion      Local anesthesia used?: Yes    Local anesthetic:  Topical anesthetic    Details:  Needle Size:  21 G  Ultrasonic Guidance for needle placement?: No    Approach:  Posterior  Location:  Shoulder  Site:  L subacromial bursa  Medications:  5 mL LIDOcaine (PF) 20 mg/mL (2%) 20 mg/mL (2 %); 6 mg betamethasone acetate-betamethasone sodium phosphate 6 mg/mL  Patient tolerance:  Patient tolerated the procedure well with no immediate complications

## 2022-10-12 ENCOUNTER — OFFICE VISIT (OUTPATIENT)
Dept: ORTHOPEDICS | Facility: CLINIC | Age: 52
End: 2022-10-12
Payer: COMMERCIAL

## 2022-10-12 DIAGNOSIS — G89.29 CHRONIC LEFT SHOULDER PAIN: ICD-10-CM

## 2022-10-12 DIAGNOSIS — Z98.890 S/P RIGHT ROTATOR CUFF REPAIR: Primary | ICD-10-CM

## 2022-10-12 DIAGNOSIS — M25.512 CHRONIC LEFT SHOULDER PAIN: ICD-10-CM

## 2022-10-12 PROCEDURE — 99024 POSTOP FOLLOW-UP VISIT: CPT | Mod: ,,, | Performed by: ORTHOPAEDIC SURGERY

## 2022-10-12 PROCEDURE — 99024 PR POST-OP FOLLOW-UP VISIT: ICD-10-PCS | Mod: ,,, | Performed by: ORTHOPAEDIC SURGERY

## 2022-10-12 PROCEDURE — 1159F PR MEDICATION LIST DOCUMENTED IN MEDICAL RECORD: ICD-10-PCS | Mod: CPTII,,, | Performed by: ORTHOPAEDIC SURGERY

## 2022-10-12 PROCEDURE — 4010F ACE/ARB THERAPY RXD/TAKEN: CPT | Mod: CPTII,,, | Performed by: ORTHOPAEDIC SURGERY

## 2022-10-12 PROCEDURE — 1159F MED LIST DOCD IN RCRD: CPT | Mod: CPTII,,, | Performed by: ORTHOPAEDIC SURGERY

## 2022-10-12 PROCEDURE — 4010F PR ACE/ARB THEARPY RXD/TAKEN: ICD-10-PCS | Mod: CPTII,,, | Performed by: ORTHOPAEDIC SURGERY

## 2022-10-12 NOTE — PROGRESS NOTES
Chief Complaint:   Chief Complaint   Patient presents with    Right Shoulder - Post-op Evaluation    Post-op Evaluation     3 month s/p Right shoulder RTC rep here for eval at [facility]. Doing good.  sx 7/14/22 gl. 10/12/22.  mn     History of present illness:    07/14/2022:  Right shoulder arthroscopic rotator cuff repair with allograft    He returns today.  He is working in PT.      Musculoskeletal:   Incisions healing.  Distally neurovascular intact.  His range of motion actively assisted is full.         Assessment: S/P right rotator cuff repair    Chronic left shoulder pain      Plan:  Continue formal physical therapy.  I will see him back in 8 weeks for range-of-motion check.  Plan to get back to work at that point.  In regards to his left shoulder we previously tried an injection.  He has had persistent pain with a weakness of the shoulder.  Will get MRI to evaluate

## 2022-12-12 ENCOUNTER — OFFICE VISIT (OUTPATIENT)
Dept: ORTHOPEDICS | Facility: CLINIC | Age: 52
End: 2022-12-12
Payer: COMMERCIAL

## 2022-12-12 DIAGNOSIS — Z98.890 S/P ARTHROSCOPY OF RIGHT SHOULDER: Primary | ICD-10-CM

## 2022-12-12 PROCEDURE — 1159F PR MEDICATION LIST DOCUMENTED IN MEDICAL RECORD: ICD-10-PCS | Mod: CPTII,,, | Performed by: NURSE PRACTITIONER

## 2022-12-12 PROCEDURE — 4010F PR ACE/ARB THEARPY RXD/TAKEN: ICD-10-PCS | Mod: CPTII,,, | Performed by: NURSE PRACTITIONER

## 2022-12-12 PROCEDURE — 4010F ACE/ARB THERAPY RXD/TAKEN: CPT | Mod: CPTII,,, | Performed by: NURSE PRACTITIONER

## 2022-12-12 PROCEDURE — 1159F MED LIST DOCD IN RCRD: CPT | Mod: CPTII,,, | Performed by: NURSE PRACTITIONER

## 2022-12-12 PROCEDURE — 99213 PR OFFICE/OUTPT VISIT, EST, LEVL III, 20-29 MIN: ICD-10-PCS | Mod: ,,, | Performed by: NURSE PRACTITIONER

## 2022-12-12 PROCEDURE — 99213 OFFICE O/P EST LOW 20 MIN: CPT | Mod: ,,, | Performed by: NURSE PRACTITIONER

## 2022-12-12 NOTE — PROGRESS NOTES
Chief Complaint:   Chief Complaint   Patient presents with    Right Shoulder - Follow-up    Follow-up     5 month s/p right shoulder RTC sx 22, range-of-motion check, attending PLingT., no brace, pt states he is doing well and has no complaints. Unable to obtain vitals.       Consulting Physician: No ref. provider found    History of present illness:  2022:  Right shoulder arthroscopic rotator cuff repair with allograft     He returns today. He denies much pain. Working with therapy. Overall doing well and getting back to normal activity.       Past Medical History:   Diagnosis Date    Carpal tunnel syndrome     Hypertension     Sleep apnea     cpap       Past Surgical History:   Procedure Laterality Date    ARTHROSCOPIC REPAIR OF ROTATOR CUFF OF SHOULDER Right 2022    Procedure: REPAIR, ROTATOR CUFF, ARTHROSCOPIC;  Surgeon: Joey Proctor Jr., MD;  Location: [facility];  Service: Orthopedics;  Laterality: Right;  Cuff Mend    CARPAL TUNNEL RELEASE      COLONOSCOPY N/A     HERNIA REPAIR      KNEE ARTHROSCOPY      KNEE SURGERY      SHOULDER ARTHROSCOPY      SHOULDER SURGERY         Current Outpatient Medications   Medication Sig    celecoxib (CELEBREX) 200 MG capsule Take 200 mg by mouth once daily.    fluticasone propionate (FLONASE) 50 mcg/actuation nasal spray SMARTSI-2 Spray(s) Both Nares Daily PRN    testosterone cypionate (DEPOTESTOTERONE CYPIONATE) 200 mg/mL injection SMARTSI.5 Milliliter(s) IM Once a Week    trandolapriL (MAVIK) 4 MG Tab Take 4 mg by mouth once daily.    verapamiL (VERELAN) 240 MG C24P Take 240 mg by mouth once daily.    ketorolac (TORADOL) 10 mg tablet Take 1 tablet (10 mg total) by mouth 3 (three) times daily. (Patient not taking: Reported on 10/12/2022)    methocarbamoL (ROBAXIN) 750 MG Tab Take 1 tablet (750 mg total) by mouth 3 (three) times daily. (Patient not taking: Reported on 10/12/2022)    oxyCODONE-acetaminophen (PERCOCET) 5-325 mg per tablet Take 1 tablet by mouth  every 6 (six) hours as needed for Pain. (Patient not taking: Reported on 10/12/2022)     No current facility-administered medications for this visit.       Review of patient's allergies indicates:  No Known Allergies    History reviewed. No pertinent family history.    Social History     Socioeconomic History    Marital status:    Tobacco Use    Smoking status: Never    Smokeless tobacco: Former     Types: Snuff    Tobacco comments:     3 times daily   Substance and Sexual Activity    Alcohol use: Yes     Alcohol/week: 12.0 standard drinks     Types: 12 Cans of beer per week    Drug use: Never       Review of Systems:    Constitution:   Denies chills, fever, and sweats.  HENT:   Denies headaches or blurry vision.  Cardiovascular:  Denies chest pain or irregular heart beat.  Respiratory:   Denies cough or shortness of breath.  Gastrointestinal:  Denies abdominal pain, nausea, or vomiting.  Musculoskeletal:   Denies muscle cramps.  Neurological:   Denies dizziness or focal weakness.  Psychiatric/Behavior: Normal mental status.  Hematology/Lymph:  Denies bleeding problem or easy bruising/bleeding.  Skin:    Denies rash or suspicious lesions.    Examination:    Vital Signs:  There were no vitals filed for this visit.    There is no height or weight on file to calculate BMI.    Constitution:   Well-developed, well nourished patient in no acute distress.  Neurological:   Alert and oriented x 3 and cooperative to examination.     Psychiatric/Behavior: Normal mental status.  Respiratory:   No shortness of breath.  Eyes:    Extraoccular muscles intact  Skin:    No scars, rash or suspicious lesions.    MSK:   Shoulder Exam:                   Right        Left  Skin:                                   Normal     Normal  AC joint tenderness:           None         None  Forward Flexion:                180            180  Abduction:                          100           180  External Rotation:               80               80  Internal Rotation:                80             80  Supraspinatus stress test: Neg           Neg  Hawkin's Impingement:     Neg           Neg  Neer Impingement:            Neg           Neg  Apprehension:                   Neg           Neg  Es[place]'s:                           Neg           Neg  Speed's test:                     Neg            Neg  Strength:  External Rotation:           5/5                5/5  Lift Off/belly press:          5/5                5/5    N-V status:                   Intact             Intact    C-spine: Normal ROM, NT       Assessment: S/P arthroscopy of right shoulder        Plan:  Doing well s/p above. Ok to gradually resume normal activity. He can follow up if he has any issues.

## 2022-12-12 NOTE — LETTER
December 12, 2022       [facility]  [address], [address]  [place] [address]  Phone: [phone]  Fax: [phone]       Patient: Erik Vazquez   YOB: 1970  Date of Visit: 12/12/2022    To Whom It May Concern:    Ceferino Vazquez  was at [facility] on 12/12/2022. The patient may return to Work on 01/02/2022 this patient will be fully released on this date. If you have any questions or concerns, or if I can be of further assistance, please do not hesitate to contact me.    Sincerely,    Jose A Proctor MD

## 2025-05-01 ENCOUNTER — HOSPITAL ENCOUNTER (OUTPATIENT)
Dept: RADIOLOGY | Facility: HOSPITAL | Age: 55
Discharge: HOME | End: 2025-05-01
Attending: FAMILY MEDICINE

## 2025-05-01 DIAGNOSIS — Z13.6 SCREENING FOR ISCHEMIC HEART DISEASE: ICD-10-CM

## 2025-05-01 PROCEDURE — 75571 CT HRT W/O DYE W/CA TEST: CPT | Mod: TC

## (undated) DEVICE — BLADE SURG CARBON STEEL SZ11

## (undated) DEVICE — SUT FIBERWIRE 0 38 W/NDL

## (undated) DEVICE — DRAPE STERI INSTRUMENT 1018

## (undated) DEVICE — GLOVE PROTEXIS LTX MICRO 8

## (undated) DEVICE — BLADE SHAVER LANZA 4.2X13CM

## (undated) DEVICE — SEE MEDLINE ITEM 157160

## (undated) DEVICE — CLOSURE SKIN STERI STRIP 1/2X4

## (undated) DEVICE — TUBE SUCTION MEDI-VAC STERILE

## (undated) DEVICE — NDL ANES SPINAL 18X3.5ST 18G

## (undated) DEVICE — Device

## (undated) DEVICE — PEROXIDE HYDROGEN 3% 16OZ

## (undated) DEVICE — TUBE SET INFLOW/OUTFLOW

## (undated) DEVICE — PILLOW FACE ADLT FOAM W/VELCRO

## (undated) DEVICE — ELECTRODE PATIENT RETURN DISP

## (undated) DEVICE — GLOVE PROTEXIS BLUE LATEX 6.5

## (undated) DEVICE — GAUZE SPONGE 4X4 12PLY

## (undated) DEVICE — KIT SURGICAL TURNOVER

## (undated) DEVICE — NDL SUREFIRE SCORPION RC

## (undated) DEVICE — NDL 26.5 TAPR CRV XLOOP

## (undated) DEVICE — DRAPE INCISE IOBAN 2 23X23IN

## (undated) DEVICE — GLOVE PROTEXIS HYDROGEL SZ6

## (undated) DEVICE — SPONGE GAUZE 16PLY 4X4

## (undated) DEVICE — KIT TRIMANO

## (undated) DEVICE — CUBE COLD THERAPY POLAR PAD

## (undated) DEVICE — GLOVE PROTEXIS HYDROGEL SZ8

## (undated) DEVICE — DRAPE U-DRAPE ADHESIVE 60X60IN

## (undated) DEVICE — PASSPORT BUTTON CANNULA

## (undated) DEVICE — GOWN SURGICAL XX LARGE X LONG

## (undated) DEVICE — SOL NACL IRR 3000ML

## (undated) DEVICE — DRAPE STERI U-SHAPED 47X51IN

## (undated) DEVICE — COVER MAYO STAND REINFRCD 30

## (undated) DEVICE — APPLICATOR CHLORAPREP ORN 26ML

## (undated) DEVICE — SUT 3-0 MONOCRYL PLUS PS-2

## (undated) DEVICE — PROBE ARTHO ENERGY 90 DEG

## (undated) DEVICE — TAPE ADH MEDIPORE 4 X 10YDS

## (undated) DEVICE — PAD ABD 8X10 STERILE

## (undated) DEVICE — CUSHION  WC FOAM 20X20X.75IN

## (undated) DEVICE — GRAFT SPREADER